# Patient Record
Sex: FEMALE | Race: WHITE
[De-identification: names, ages, dates, MRNs, and addresses within clinical notes are randomized per-mention and may not be internally consistent; named-entity substitution may affect disease eponyms.]

---

## 2018-03-05 ENCOUNTER — HOSPITAL ENCOUNTER (EMERGENCY)
Dept: HOSPITAL 88 - ER | Age: 38
Discharge: HOME | End: 2018-03-05
Payer: COMMERCIAL

## 2018-03-05 VITALS — BODY MASS INDEX: 31.07 KG/M2 | HEIGHT: 70 IN | WEIGHT: 217 LBS

## 2018-03-05 DIAGNOSIS — I10: ICD-10-CM

## 2018-03-05 DIAGNOSIS — F41.9: ICD-10-CM

## 2018-03-05 DIAGNOSIS — M79.631: Primary | ICD-10-CM

## 2018-03-05 PROCEDURE — 99283 EMERGENCY DEPT VISIT LOW MDM: CPT

## 2018-03-05 NOTE — XMS REPORT
Patient Summary Document

 Created on: 2018



LAUREANO CARDOZA

External Reference #: [id number]

: 1980

Sex: Female



Demographics







 Address  [address] [place] [address]  [address]

 

 Home Phone  [phone]

 

 Preferred Language  Unknown

 

 Marital Status  Unknown

 

 [employer]ism Affiliation  Unknown

 

 Race  Unknown

 

 Additional Race(s)  

 

 Ethnic Group  Unknown





Author







 [facility]  [facility]

 

 Address  Unknown

 

 Phone  Unavailable







Care Team Providers







 Care Team Member Name  Role  Phone

 

 JENNIFER ADAM  Unavailable  Unavailable







Problems

This patient has no known problems.



Allergies, Adverse Reactions, Alerts

This patient has no known allergies or adverse reactions.



Medications

This patient has no known medications.



Results







 Test Description  Test Time  Test Comments  Text Results  Atomic Results  
Result Comments









 CT CHEST W            [place] [address]      Patient Name: LAUREANO CARDOZA   
MR #: [id number]    : 1980 Age/Sex: 37/F  Acct #: [id number] Req #: 
[id number]  Adm Physician: JENNIFER ADAM MD    Ordered by: JAKI LARES MD  
Report #: [id number]   Location: [facility]  Room/Bed: [place] [address]    __________________
________________________________________________________________________________
_    Procedure: [id number] CT/CT CHEST W  Exam Date: 17                   
         Exam Time: 1345       REPORT STATUS: Signed    PROCEDURE:   CT scan of 
the chest  WITH intravenous contrast, using PE protocol.       TECHNIQUE:   The 
chest was scanned utilizing a multidetector helical scanner from    the lung 
apex through the level of the adrenal glands after the IV    administration of 
60 cc of Isovue 370.  Coronal and sagittal    multiplanar reformations were 
obtained.       COMPARISON: None.       INDICATIONS:  CHETS PAIN, PE PROTOCOL  
        FINDINGS:   Lines/tubes:  None.       Lungs and Airways: No filling 
defects in the main, right or left    pulmonary arteries to the segmental level 
to suggest pulmonary    embolism.   Minimal centrilobular emphysematous changes 
in the upper lobes.   Mild bilateral lower lobe dependent atelectasis.   4-5 mm 
pulmonary nodule in the right upper lobe (series 3, image 45,    and sagittal 
image 24).   2-3 mm benign jo-fissural nodule at the minor fissure (series 3,
    image 49, and sagittal image 22).   No other nodules or any masses or 
consolidation.   Airways are clear, without endobronchial lesions.       Pleura
: No effusion, or pneumothorax.       Heart and mediastinum: Thyroid is 
unremarkable. Heart size is normal.    No pericardial effusion. The aorta is non
-aneurysmal. Main pulmonary    artery is normal in caliber.       Lymph nodes: 
No mediastina, hilar, or axillary adenopathy.   Abdomen: Limited contrast-
enhanced views of the upper abdomen show no    abnormality within the 
visualized spleen, pancreas, or kidneys. Marked    diffuse hepatic steatosis. 
The adrenal glands are normal.       Bones: No acute bony abnormalities. 
Visualized soft tissues are    unremarkable.       IMPRESSION:        1. no CT 
evidence of pulmonary embolism.   2. 4-5 mm pulmonary nodule in the right upper 
lobe. If the patient is    low risk, no further followup is indicated. If the 
patient is high    risk, consider noncontrast CT chest low dose, nodule 
protocol in 12    months to document stability.   3. No consolidation, masses 
or effusion.   4. Marked diffuse hepatic steatosis.                   Carmine Baptiste M.D.     Dictated by:  Carmine Baptiste M.D. on 2017 at 14:37 
       Electronically approved by:  Carmine Baptiste M.D. on 2017 at    
14:37                Dictated By: CARMINE BAPTISTE MD  Electronically Signed By: 
CARMINE BAPTISTE MD on 17 1437  Transcribed By: ELLIE on 17 1437     
  COPY TO:   JAKI LARES MD           

 

 Stress Test - Treadmill ONLY         [place] [address]    Patient Name : LAUREANO CARDOZA         MR #: [id number]   : 1980         Age/Sex: 37/F      
Acct # : [id number]           Adm Physician  : JENNIFER ADAM MD       Admit 
Date  :  11/15/17       Location : [facility]    Room/Bed : [facility] 186-1          ______
________________________________________________________________________________
_____________     REPORT: Cardiology Report       DATE OF STUDY:          
CARDIAC STRESS TEST        TECHNICAL DETAILS:  The protocol is Onesimo with 
target heart rate at 156 per    minute.      RESULTS   1. Patient exercised for 
9 minutes and 21 seconds.   2. Heart rate increased from 82 to 170 per minute. 
  3. Blood pressure increased from 113/85 to 160/90.   4. No chest pain.   5. 
Test stopped because patient achieved target heart rate and shortness    of 
breath.   6. No EKG changes.      IMPRESSION:  Negative cardiac stress test 
with very good exercise    tolerance.  Limitations of this negative stress test 
are discussed and    explained.           DD:  2017 10:58   DT:  2017 11:32   Job#:  [id number] SHORT             Signature                           
                                        Date                           Dictated 
By: JAKI LARES MD  Transcribed By: EDS on 17   <Electronically 
signed by JAKI LARES MD><<Signature on File>>17 [id number]    COPY TO:    
    

 

 CHEST SINGLE (NOT PORTABLE)            [place] [address]      Patient Name: 
LAUREANO CARDOZA   MR #: [id number]    : 1980 Age/Sex: 37/F  Acct #: 
[id number] Req #: [id number]  Adm Physician:     Ordered by: LUKE JULIO NP  Report #: [id number]   Location: ER  Room/Bed:     ________________________
___________________________________________________________________________    
Procedure: [id number] DX/CHEST SINGLE (NOT PORTABLE)  Exam Date: 11/15/17       
                     Exam Time:        REPORT STATUS: Signed    Examination
: Single AP view of the chest.      COMPARISON: AP chest 2010      
INDICATION: Chest pain          IMPRESSION:       1.  Lines and Tubes: None   
2.  Lungs are grossly clear.  No consolidation or effusion.   3.  
Cardiomediastinal silhouette is normal.   Pulmonary vasculature is normal.   4.
  No acute bony abnormalities. Stable rightward curvature of the thoracic   
spine.      Signed by: Dr. Carmine Baptiste M.D. on 11/15/2017 8:07 PM        
Dictated By: CARMINE BAPTISTE MD  Electronically Signed By: CARMINE BAPTISTE MD on 
11/15/17 2007  Transcribed By: VALENCIA on 11/15/17 2007       COPY TO:   LUKE JULIO NP           

 

 US GALLBLADDER            [place] [address]      Patient Name: LAUREANO CARDOZA   
MR #: [id number]    : 1980 Age/Sex: 37/F  Acct #: [id number] Req #: 
[id number]  Adm Physician:     Ordered by: LUKE JULIO NP  Report #: [id number]
[id number]   Location: ER  Room/Bed:     _____________________________________________
______________________________________________________    Procedure: [id number] 
US/US GALLBLADDER  Exam Date: 11/15/17                            Exam Time: 
       REPORT STATUS: Signed    EXAM: US GALLBLADDER   DATE: 11/15/2017 12:
00 AM     INDICATION:    S upper abd pain, with mild elevated LFT, lipase,     
  COMPARISON: None    TECHNIQUE: Transverse and longitudinal gray scale and 
color doppler sonographic   images of the upper abdomen were obtained.       
FINDINGS:        LIVER   19.4 cm in the right midclavicular line.   Increased 
echogenicity, normal contour, no masses.      GALLBLADDER   No stones, sludge, 
wall-thickening or pericholecystic fluid.   Negative sonographic Tapia's sign.
      BILE DUCTS   No intra nor extra-hepatic biliary dilation.   Common bile 
duct measures 0.4 cm      PANCREAS: Poorly visualized due to bowel gas.      
RIGHT KIDNEY: 13.3 cm   Echogenicity: Normal   Collecting System:  No 
hydronephrosis   Stones:  None   Cyst/Mass: None      VESSELS:   Aorta: 
Visualized portions are within normal size limits   Inferior Vena Cava: 
Visualized portions are normal   Main Portal Vein: 1.1 cm, normal size with 
hepatopetal flow.      FREE FLUID: None      IMPRESSION:   1. Hepatomegaly with 
hepatic steatosis.   2. No cholelithiasis or evidence of acute cholecystitis.  
     Signed by: Dr Myles Marcelo MD on 11/15/2017 10:32 PM        Dictated By: 
MYLES MARCELO MD  Electronically Signed By: MYLES MARCELO MD on 11/15/17 
2232  Transcribed By: VALENCIA on 11/15/17 2232       COPY TO:   LUKE JULIO NP

## 2018-03-05 NOTE — DIAGNOSTIC IMAGING REPORT
PROCEDURE:X-RAY RIGHT FOREARM, TWO VIEWS

 

COMPARISON:None.

 

INDICATIONS:ARM PULLED BY DOG WHEN WALKING 

 

FINDINGS:

There are no fractures, dislocations, lytic or blastic lesions. The 

bones are well-mineralized. The soft-tissues are unremarkable.

 

CONCLUSION:

Normal right forearm radiograph.

 

Dictated by:  Sunny Wright M.D. on 3/05/2018 at 12:01     

Electronically approved by:  Sunny Wright M.D. on 3/05/2018 at 12:01

## 2018-06-22 ENCOUNTER — HOSPITAL ENCOUNTER (EMERGENCY)
Dept: HOSPITAL 88 - ER | Age: 38
Discharge: HOME | End: 2018-06-22
Payer: COMMERCIAL

## 2018-06-22 VITALS — HEIGHT: 70 IN | WEIGHT: 217 LBS | BODY MASS INDEX: 31.07 KG/M2

## 2018-06-22 VITALS — SYSTOLIC BLOOD PRESSURE: 118 MMHG | DIASTOLIC BLOOD PRESSURE: 78 MMHG

## 2018-06-22 DIAGNOSIS — R11.2: ICD-10-CM

## 2018-06-22 DIAGNOSIS — K29.70: ICD-10-CM

## 2018-06-22 DIAGNOSIS — R19.7: ICD-10-CM

## 2018-06-22 DIAGNOSIS — R10.33: Primary | ICD-10-CM

## 2018-06-22 LAB
ALBUMIN SERPL-MCNC: 4.4 G/DL (ref 3.5–5)
ALBUMIN/GLOB SERPL: 1.2 {RATIO} (ref 0.8–2)
ALP SERPL-CCNC: 68 IU/L (ref 40–150)
ALT SERPL-CCNC: 153 IU/L (ref 0–55)
AMYLASE SERPL-CCNC: 57 U/L (ref 25–125)
ANION GAP SERPL CALC-SCNC: 15.8 MMOL/L (ref 8–16)
BACTERIA URNS QL MICRO: (no result) /HPF
BASOPHILS # BLD AUTO: 0.1 10*3/UL (ref 0–0.1)
BASOPHILS NFR BLD AUTO: 0.8 % (ref 0–1)
BILIRUB UR QL: NEGATIVE
BUN SERPL-MCNC: 12 MG/DL (ref 7–26)
BUN/CREAT SERPL: 13 (ref 6–25)
CALCIUM SERPL-MCNC: 10 MG/DL (ref 8.4–10.2)
CHLORIDE SERPL-SCNC: 97 MMOL/L (ref 98–107)
CLARITY UR: CLEAR
CO2 SERPL-SCNC: 23 MMOL/L (ref 22–29)
COLOR UR: COLORLESS
DEPRECATED APTT PLAS QN: 28.1 SECONDS (ref 23.8–35.5)
DEPRECATED INR PLAS: 1.06
DEPRECATED NEUTROPHILS # BLD AUTO: 5.1 10*3/UL (ref 2.1–6.9)
DEPRECATED RBC URNS MANUAL-ACNC: (no result) /HPF (ref 0–5)
EGFRCR SERPLBLD CKD-EPI 2021: > 60 ML/MIN (ref 60–?)
EOSINOPHIL # BLD AUTO: 0.3 10*3/UL (ref 0–0.4)
EOSINOPHIL NFR BLD AUTO: 2.9 % (ref 0–6)
EPI CELLS URNS QL MICRO: (no result) /LPF
ERYTHROCYTE [DISTWIDTH] IN CORD BLOOD: 13.2 % (ref 11.7–14.4)
GLOBULIN PLAS-MCNC: 3.6 G/DL (ref 2.3–3.5)
GLUCOSE SERPLBLD-MCNC: 88 MG/DL (ref 74–118)
HCG UR QL: NEGATIVE
HCT VFR BLD AUTO: 37.8 % (ref 34.2–44.1)
HGB BLD-MCNC: 12.4 G/DL (ref 12–16)
KETONES UR QL STRIP.AUTO: NEGATIVE
LEUKOCYTE ESTERASE UR QL STRIP.AUTO: NEGATIVE
LIPASE SERPL-CCNC: 67 U/L (ref 8–78)
LYMPHOCYTES # BLD: 4.6 10*3/UL (ref 1–3.2)
LYMPHOCYTES NFR BLD AUTO: 41.8 % (ref 18–39.1)
MCH RBC QN AUTO: 30.7 PG (ref 28–32)
MCHC RBC AUTO-ENTMCNC: 32.8 G/DL (ref 31–35)
MCV RBC AUTO: 93.6 FL (ref 81–99)
MONOCYTES # BLD AUTO: 0.9 10*3/UL (ref 0.2–0.8)
MONOCYTES NFR BLD AUTO: 7.7 % (ref 4.4–11.3)
NEUTS SEG NFR BLD AUTO: 46.4 % (ref 38.7–80)
NITRITE UR QL STRIP.AUTO: NEGATIVE
PLATELET # BLD AUTO: 192 X10E3/UL (ref 140–360)
POTASSIUM SERPL-SCNC: 3.8 MMOL/L (ref 3.5–5.1)
PROT UR QL STRIP.AUTO: NEGATIVE
PROTHROMBIN TIME: 13 SECONDS (ref 11.9–14.5)
RBC # BLD AUTO: 4.04 X10E6/UL (ref 3.6–5.1)
SODIUM SERPL-SCNC: 132 MMOL/L (ref 136–145)
SP GR UR STRIP: 1 (ref 1.01–1.02)
UROBILINOGEN UR STRIP-MCNC: 0.2 MG/DL (ref 0.2–1)
WBC #/AREA URNS HPF: (no result) /HPF (ref 0–5)

## 2018-06-22 PROCEDURE — 83690 ASSAY OF LIPASE: CPT

## 2018-06-22 PROCEDURE — 82150 ASSAY OF AMYLASE: CPT

## 2018-06-22 PROCEDURE — 81001 URINALYSIS AUTO W/SCOPE: CPT

## 2018-06-22 PROCEDURE — 96376 TX/PRO/DX INJ SAME DRUG ADON: CPT

## 2018-06-22 PROCEDURE — 96375 TX/PRO/DX INJ NEW DRUG ADDON: CPT

## 2018-06-22 PROCEDURE — 80053 COMPREHEN METABOLIC PANEL: CPT

## 2018-06-22 PROCEDURE — 85610 PROTHROMBIN TIME: CPT

## 2018-06-22 PROCEDURE — 81025 URINE PREGNANCY TEST: CPT

## 2018-06-22 PROCEDURE — 74177 CT ABD & PELVIS W/CONTRAST: CPT

## 2018-06-22 PROCEDURE — 99284 EMERGENCY DEPT VISIT MOD MDM: CPT

## 2018-06-22 PROCEDURE — 96374 THER/PROPH/DIAG INJ IV PUSH: CPT

## 2018-06-22 PROCEDURE — 85730 THROMBOPLASTIN TIME PARTIAL: CPT

## 2018-06-22 PROCEDURE — 36415 COLL VENOUS BLD VENIPUNCTURE: CPT

## 2018-06-22 PROCEDURE — 87086 URINE CULTURE/COLONY COUNT: CPT

## 2018-06-22 PROCEDURE — 85025 COMPLETE CBC W/AUTO DIFF WBC: CPT

## 2018-06-22 NOTE — DIAGNOSTIC IMAGING REPORT
PROCEDURE: CT ABDOMEN AND PELVIS WITH CONTRAST

 

TECHNIQUE: 

The abdomen and pelvis were scanned utilizing a multidetector helical 

scanner from the diaphragm to the lesser trochanter after the IV 

administration of 100 cc of Isovue 370 and the oral administration of 

Gastrografin intermixed with water.  Coronal and sagittal multiplanar 

reformations were obtained.

 

DLP:  713.61  mGy-cm

 

COMPARISON: None.

 

INDICATIONS:   ABDOMINAL PAIN

 

FINDINGS:

LOWER THORAX: Normal.

 

HEPATOBILIARY: Diffuse hepatic steatosis without focal hepatic lesions. 

 No biliary ductal dilatation.

SPLEEN: No splenomegaly.

PANCREAS: No focal masses or ductal dilatation.

 

ADRENALS: No adrenal nodules.

KIDNEYS/URETERS: No hydronephrosis or solid mass lesions. Hypodensity 

in the left kidney compatible with a cyst. Small less than 3 mm in size 

interpolar renal stones bilaterally.

PELVIC ORGANS/BLADDER: Unremarkable.

 

PERITONEUM / RETROPERITONEUM: No free air or fluid.

LYMPH NODES: No lymphadenopathy.

VESSELS: Accessory left renal artery.

 

GI TRACT: No distention or wall thickening. The appendix is not 

visualized.

 

BONES AND SOFT TISSUES: Mild thoracolumbar scoliosis.

 

IMPRESSION:

 

1. Bilateral small nonobstructing interpolar renal stones.

2. Diffuse hepatic steatosis. 

 

 

Aditya Dietz D.O.  

Dictated by:  Aditya Dietz D.O. on 6/22/2018 at 7:32     

Electronically approved by:  Aditya Dietz D.O. on 6/22/2018 at 7:32

## 2019-03-17 ENCOUNTER — HOSPITAL ENCOUNTER (EMERGENCY)
Dept: HOSPITAL 88 - ER | Age: 39
Discharge: HOME | End: 2019-03-17
Payer: COMMERCIAL

## 2019-03-17 VITALS — BODY MASS INDEX: 31.07 KG/M2 | WEIGHT: 217 LBS | HEIGHT: 70 IN

## 2019-03-17 VITALS — SYSTOLIC BLOOD PRESSURE: 102 MMHG | DIASTOLIC BLOOD PRESSURE: 67 MMHG

## 2019-03-17 DIAGNOSIS — K52.9: ICD-10-CM

## 2019-03-17 DIAGNOSIS — R10.31: Primary | ICD-10-CM

## 2019-03-17 DIAGNOSIS — R11.0: ICD-10-CM

## 2019-03-17 LAB
ALBUMIN SERPL-MCNC: 4.3 G/DL (ref 3.5–5)
ALBUMIN/GLOB SERPL: 1 {RATIO} (ref 0.8–2)
ALP SERPL-CCNC: 76 IU/L (ref 40–150)
ALT SERPL-CCNC: 69 IU/L (ref 0–55)
ANION GAP SERPL CALC-SCNC: 14.8 MMOL/L (ref 8–16)
BACTERIA URNS QL MICRO: (no result) /HPF
BASOPHILS # BLD AUTO: 0.1 10*3/UL (ref 0–0.1)
BASOPHILS NFR BLD AUTO: 0.9 % (ref 0–1)
BILIRUB UR QL: NEGATIVE
BUN SERPL-MCNC: 13 MG/DL (ref 7–26)
BUN/CREAT SERPL: 15 (ref 6–25)
CALCIUM SERPL-MCNC: 9.7 MG/DL (ref 8.4–10.2)
CHLORIDE SERPL-SCNC: 104 MMOL/L (ref 98–107)
CLARITY UR: CLEAR
CO2 SERPL-SCNC: 24 MMOL/L (ref 22–29)
COLOR UR: YELLOW
DEPRECATED NEUTROPHILS # BLD AUTO: 5.8 10*3/UL (ref 2.1–6.9)
DEPRECATED RBC URNS MANUAL-ACNC: (no result) /HPF (ref 0–5)
EGFRCR SERPLBLD CKD-EPI 2021: > 60 ML/MIN (ref 60–?)
EOSINOPHIL # BLD AUTO: 0.2 10*3/UL (ref 0–0.4)
EOSINOPHIL NFR BLD AUTO: 2 % (ref 0–6)
EPI CELLS URNS QL MICRO: (no result) /LPF
ERYTHROCYTE [DISTWIDTH] IN CORD BLOOD: 12.7 % (ref 11.7–14.4)
GLOBULIN PLAS-MCNC: 4.1 G/DL (ref 2.3–3.5)
GLUCOSE SERPLBLD-MCNC: 82 MG/DL (ref 74–118)
HCT VFR BLD AUTO: 40.9 % (ref 34.2–44.1)
HGB BLD-MCNC: 13.2 G/DL (ref 12–16)
KETONES UR QL STRIP.AUTO: NEGATIVE
LEUKOCYTE ESTERASE UR QL STRIP.AUTO: NEGATIVE
LIPASE SERPL-CCNC: 60 U/L (ref 8–78)
LYMPHOCYTES # BLD: 3.3 10*3/UL (ref 1–3.2)
LYMPHOCYTES NFR BLD AUTO: 32.3 % (ref 18–39.1)
MCH RBC QN AUTO: 30.6 PG (ref 28–32)
MCHC RBC AUTO-ENTMCNC: 32.3 G/DL (ref 31–35)
MCV RBC AUTO: 94.7 FL (ref 81–99)
MONOCYTES # BLD AUTO: 0.7 10*3/UL (ref 0.2–0.8)
MONOCYTES NFR BLD AUTO: 7.3 % (ref 4.4–11.3)
NEUTS SEG NFR BLD AUTO: 57 % (ref 38.7–80)
NITRITE UR QL STRIP.AUTO: NEGATIVE
PLATELET # BLD AUTO: 201 X10E3/UL (ref 140–360)
POTASSIUM SERPL-SCNC: 3.8 MMOL/L (ref 3.5–5.1)
PROT UR QL STRIP.AUTO: NEGATIVE
RBC # BLD AUTO: 4.32 X10E6/UL (ref 3.6–5.1)
SODIUM SERPL-SCNC: 139 MMOL/L (ref 136–145)
SP GR UR STRIP: 1 (ref 1.01–1.02)
UROBILINOGEN UR STRIP-MCNC: 0.2 MG/DL (ref 0.2–1)
WBC #/AREA URNS HPF: (no result) /HPF (ref 0–5)

## 2019-03-17 PROCEDURE — 74177 CT ABD & PELVIS W/CONTRAST: CPT

## 2019-03-17 PROCEDURE — 99284 EMERGENCY DEPT VISIT MOD MDM: CPT

## 2019-03-17 PROCEDURE — 83690 ASSAY OF LIPASE: CPT

## 2019-03-17 PROCEDURE — 80053 COMPREHEN METABOLIC PANEL: CPT

## 2019-03-17 PROCEDURE — 85025 COMPLETE CBC W/AUTO DIFF WBC: CPT

## 2019-03-17 PROCEDURE — 81001 URINALYSIS AUTO W/SCOPE: CPT

## 2019-03-17 PROCEDURE — 36415 COLL VENOUS BLD VENIPUNCTURE: CPT

## 2023-12-21 NOTE — DIAGNOSTIC IMAGING REPORT
CT Abdomen And Pelvis with Intravenous Contrast



INDICATION:  

^right lower quad pain

^[id number]

^1506



TECHNIQUE: Thin collimation axial images obtained from the diaphragm to the

level of the pubic symphysis following the uneventful administration of  100 cc

of low osmolar, nonionic intravenous contrast.



Dose reduction techniques used: Automated exposure control, adjustment of the

mAs and/or kVp according to patient size, standardized low-dose protocol,

and/or iterative reconstruction technique.





RADIATION DOSE:

     Total DLP: 755  mGy*cm    

     Estimated effective dose: (DLP x 0.015 x size factor) mSv

     CTDIvol has been reviewed. It is below the limits set by the Radiation

Protocol Committee (RPC).



COMPARISON: Report of CT of the abdomen/pelvis performed 6/12/2010.

 

ABDOMEN FINDINGS:



Lung Bases: Subsegmental atelectasis in the left posterior costophrenic angle.



Liver: Steatosis. The right lobe measures 20 cm in length.  No evidence for

mass.



Gallbladder: Present and appears normal. No biliary ductal dilatation.



Pancreas: Normal attenuation without mass or ductal dilatation.



Spleen: Normal in size.  No evidence of mass.



Adrenal Glands: No evidence for mass.



Kidneys: 



Right: Normal enhancement.  No soft tissue mass. Punctate calculus in the

interpolar region.  No hydronephrosis.



Left:  Normal enhancement.  Low attenuating lesion in the upper pole measures 4

mm and is too small to characterize.. Punctate calculus in the upper pole. No

hydronephrosis.



Lymph Nodes: No enlarged abdominal or retroperitoneal lymph nodes..



Aorta:   Normal in diameter



PELVIS FINDINGS: 



Bowel: 

Stomach:  Collapsed but otherwise normal.

Small Bowel: Normal in caliber with normal wall thickness.  

Large Bowel: The cecum lies in the posterior pelvis. There is fluid in the

right colon and mild amount of stool in the transverse colon and left colon. No

mural thickening or pericolonic inflammation.  

Appendix: Located in the cul-de-sac, best seen on coronal reformations and

appears normal (coronal image 94).



Bladder: Well distended and is normal.



The ureters are normal in diameter throughout their course.



The uterus is absent. No adnexal mass.



Peritoneum/retroperitoneum: No free fluid or fluid collection.



Bones: Mild levoscoliosis may be the result of patient positioning. No

rotational component. There are mild degenerative changes of the lower thoracic

spine with mild wedging of the T10 vertebral body. Spinal canal is patent at

this level.



Soft tissues: Unremarkable.



IMPRESSION:



1.   Fluid distention of the right colon without associated inflammation or

dilatation. This may be the result of gastroenteritis.



2.   The appendix lies in the cul-de-sac of the pelvis and is normal.



3. Steatosis and hepatomegaly.



4. Bilateral intrarenal calculi. No obstructive uropathy.



Signed by: Dr. Aarti Franklin MD on 3/17/2019 5:44 PM no